# Patient Record
Sex: FEMALE | Race: WHITE | URBAN - METROPOLITAN AREA
[De-identification: names, ages, dates, MRNs, and addresses within clinical notes are randomized per-mention and may not be internally consistent; named-entity substitution may affect disease eponyms.]

---

## 2024-05-21 ENCOUNTER — OFFICE VISIT (OUTPATIENT)
Dept: FAMILY MEDICINE CLINIC | Facility: CLINIC | Age: 19
End: 2024-05-21

## 2024-05-21 VITALS
WEIGHT: 146 LBS | HEART RATE: 119 BPM | SYSTOLIC BLOOD PRESSURE: 138 MMHG | TEMPERATURE: 98 F | HEIGHT: 67 IN | OXYGEN SATURATION: 99 % | DIASTOLIC BLOOD PRESSURE: 72 MMHG | BODY MASS INDEX: 22.91 KG/M2

## 2024-05-21 DIAGNOSIS — R03.0 ELEVATED BLOOD PRESSURE READING: ICD-10-CM

## 2024-05-21 DIAGNOSIS — R09.81 CHRONIC NASAL CONGESTION: ICD-10-CM

## 2024-05-21 DIAGNOSIS — Z76.89 ENCOUNTER TO ESTABLISH CARE: Primary | ICD-10-CM

## 2024-05-21 PROCEDURE — 99203 OFFICE O/P NEW LOW 30 MIN: CPT

## 2024-05-21 RX ORDER — FLUTICASONE PROPIONATE 50 MCG
2 SPRAY, SUSPENSION (ML) NASAL DAILY
Qty: 16 G | Refills: 0 | Status: SHIPPED | OUTPATIENT
Start: 2024-05-21 | End: 2025-05-16

## 2024-05-21 RX ORDER — ALBUTEROL SULFATE 90 UG/1
2 AEROSOL, METERED RESPIRATORY (INHALATION) EVERY 4 HOURS PRN
COMMUNITY
Start: 2024-02-19 | End: 2025-05-14

## 2024-05-21 NOTE — PROGRESS NOTES
Janny Hernandez is a 18 year old female.  Chief Complaint   Patient presents with    Chest Congestion     Here for chest and nasal congestion since January 2024. Pt has tried albuterol inhaler and benzonatate for cough given at [facility] ( [facility])and amoxicillin 500 mg . Pt then went to urgent care and prescribed augmentin and steroids.      HPI:   Janny Hernandez presented to the clinic for nasal congestion since Jan 2024. Onset of symptoms with URI. Has seen  and [facility] in clinic 3 times. Placed on two rounds of antibiotics and two rounds of steroids. Symptoms return post treatment. Has not tried any OTC medications. No HX of seasonal allergies.     Current Outpatient Medications   Medication Sig Dispense Refill    albuterol 108 (90 Base) MCG/ACT Inhalation Aero Soln Inhale 2 puffs into the lungs every 4 (four) hours as needed.        History reviewed. No pertinent past medical history.   History reviewed. No pertinent surgical history.   Social History:  Social History     Socioeconomic History    Marital status: Single   Tobacco Use    Smoking status: Never    Smokeless tobacco: Never   Vaping Use    Vaping status: Never Used   Substance and Sexual Activity    Alcohol use: Not Currently    Drug use: Never    Sexual activity: Never      History reviewed. No pertinent family history.   No Known Allergies     REVIEW OF SYSTEMS:   Review of Systems   Constitutional:  Negative for activity change, fatigue and fever.   HENT:  Positive for congestion, postnasal drip and rhinorrhea. Negative for sinus pressure, sinus pain and sneezing.    Respiratory:  Negative for cough, chest tightness and shortness of breath.    Cardiovascular:  Negative for chest pain and palpitations.   Neurological: Negative.  Negative for headaches.   Psychiatric/Behavioral: Negative.     All other systems reviewed and are negative.     Wt Readings from Last 5 Encounters:   05/21/24 146 lb (66.2 kg)     Body mass  index is 22.87 kg/m².      EXAM:   /80 (BP Location: Left arm, Patient Position: Sitting, Cuff Size: adult)   Pulse 119   Temp 97.5 °F (36.4 °C) (Temporal)   Ht 5' 7\" (1.702 m)   Wt 146 lb (66.2 kg)   SpO2 99%   BMI 22.87 kg/m²   Physical Exam  Vitals reviewed.   Constitutional:       Appearance: Normal appearance.   HENT:      Head: Normocephalic and atraumatic.      Right Ear: Tympanic membrane and external ear normal.      Left Ear: Tympanic membrane and external ear normal.      Nose: Congestion and rhinorrhea present.      Right Sinus: No maxillary sinus tenderness or frontal sinus tenderness.      Left Sinus: No maxillary sinus tenderness or frontal sinus tenderness.      Mouth/Throat:      Mouth: Mucous membranes are moist.   Cardiovascular:      Rate and Rhythm: Normal rate and regular rhythm.      Pulses: Normal pulses.      Heart sounds: Normal heart sounds.   Pulmonary:      Effort: Pulmonary effort is normal.      Breath sounds: Normal breath sounds.   Neurological:      General: No focal deficit present.      Mental Status: She is alert and oriented to person, place, and time.   Psychiatric:         Mood and Affect: Mood normal.         Behavior: Behavior normal.            ASSESSMENT AND PLAN:   (Z76.89) Encounter to establish care  (primary encounter diagnosis)  (R09.81) Chronic nasal congestion  Plan: ENT Referral - [place] ([place]), fluticasone        propionate 50 MCG/ACT Nasal Suspension  Patient with chronic nasal congestion since January.  Has waxed and waned.  Has been on 2 rounds of antibiotics.  2 rounds of prednisone.  Symptoms improved on medication but then returned.  No history of seasonal allergies.  No other associated symptoms.  Advised over-the-counter antihistamine (Allegra, Claritin, Zyrtec) daily.  Prescription for Flonase to pharmacy.  Advised follow-up with ENT for further intervention and evaluation.  Referral placed.  Advise schedule.    (R03.0) Elevated blood  pressure reading  Plan: BP improved during visit. Denies symptoms. Will continue to monitor.     Follow-up for annual physical exam or sooner as needed.    The patient indicates understanding of these issues and agrees to the plan.  Chaperone offered to the patient prior to examination    This note was prepared using Dragon [employer] voice recognition dictation software. As a result errors may occur. When identified these errors have been corrected. While every attempt is made to correct errors during dictation discrepancies may still exist.

## 2024-05-23 ENCOUNTER — OFFICE VISIT (OUTPATIENT)
Dept: OTOLARYNGOLOGY | Facility: CLINIC | Age: 19
End: 2024-05-23

## 2024-05-23 DIAGNOSIS — J34.3 HYPERTROPHY OF NASAL TURBINATES: ICD-10-CM

## 2024-05-23 DIAGNOSIS — J34.2 DEVIATED NASAL SEPTUM: Primary | ICD-10-CM

## 2024-05-23 DIAGNOSIS — R09.81 NASAL CONGESTION: ICD-10-CM

## 2024-05-23 DIAGNOSIS — J34.89 NASAL OBSTRUCTION: ICD-10-CM

## 2024-05-23 RX ORDER — AZELASTINE 1 MG/ML
1 SPRAY, METERED NASAL 2 TIMES DAILY
Qty: 30 ML | Refills: 3 | Status: SHIPPED | OUTPATIENT
Start: 2024-05-23

## 2024-05-23 NOTE — PROGRESS NOTES
[facility]  OTOLARYNGOLOGY - HEAD & NECK SURGERY    5/23/2024     Reason for Consultation:   Nasal congestion    History of Present Illness:   Patient is a pleasant 18 year old female who is being seen for nasal congestion which first began in January after she sustained a sinus infection.  Since then she had been treated with multiple courses of antibiotics and prednisone which have helped her, but she has not returned completely back to normal.  She continues to have difficulty breathing through the nose.  She states that this difficulty breathing tends to alternate.  She denies any facial pressure or pain.  She denies any postnasal drip.  She does not have any signs or symptoms of allergic rhinitis.  No history of any imaging for the sinuses.  She has not yet had a full trial of a nasal steroid or nasal antihistamine.    Past Medical History  History reviewed. No pertinent past medical history.    Past Surgical History  History reviewed. No pertinent surgical history.    Family History  History reviewed. No pertinent family history.    Social History  Pediatric History   Patient Parents    pierre smith (Mother)     Other Topics Concern    Not on file   Social History Narrative    Not on file           Current Medications:  Current Outpatient Medications   Medication Sig Dispense Refill    fluticasone propionate 50 MCG/ACT Nasal Suspension 2 sprays by Each Nare route daily. 16 g 0    albuterol 108 (90 Base) MCG/ACT Inhalation Aero Soln Inhale 2 puffs into the lungs every 4 (four) hours as needed.         Allergies  No Known Allergies    Review of Systems:   A comprehensive 10 point review of systems was completed.  Pertinent positives and negatives noted in the the HPI.    Physical Exam:   There were no vitals taken for this visit.    GENERAL: No acute distress, Comfortable appearing  FACE: HB 1/6, Normal Animation  HEAD: Normocephalic  EYES: EOMI, pupils equil  EARS: Bilateral Auricles Symmetric, bilateral  tympanic membranes normal  NOSE: Nares patent bilaterally  ORAL CAVITY: Tongue mobile, Oropharynx clear, Floor of mouth clear, Posterior oropharynx normal  NECK: No palpable lymphadenopathy, thyroid not palpable, nontender    PROCEDURE: BILATERAL RIGID NASAL ENDOSCOPY  Bilateral rigid nasal endoscopy (27782) was performed. Verbal consent was obtained from the patient to proceed with rigid nasal endoscopy. The nasal cavity was decongested and topically anesthetized with a combination of Oxymetazoline and 4% Lidocaine. A rigid 4mm 30 degree nasal endoscope connected to a high-definition endoscopy system was used to examine both nasal cavities. Digital photos and/or videos of relevant exam findings were obtained. The inferior meatus, inferior turbinate, nasopharynx, middle meatus, middle turbinate, superior meatus, superior turbinate, and sphenoethmoidal recess were examined bilaterally and deemed to be normal, with any exceptions as noted below. At the completion of the procedure the endoscope was removed. The patient tolerated the procedure well. There were no complications.    Findings: The bilateral inferior turbinates were enlarged. The Septum was deviated to the left mildly. The middle meatus was patent bilaterally without any pus drainage. There were no obvious masses or polyps noted.      Results:     Laboratory Data:  No results found for: \"WBC\", \"HGB\", \"HCT\", \"PLT\", \"CREATSERUM\", \"BUN\", \"NA\", \"K\", \"CL\", \"CO2\", \"GLU\", \"CA\", \"ALB\", \"ALKPHO\", \"TP\", \"AST\", \"ALT\", \"PTT\", \"INR\", \"PTP\", \"T4F\", \"TSH\", \"TSHREFLEX\", \"RASHEL\", \"LIP\", \"GGT\", \"PSA\", \"DDIMER\", \"ESRML\", \"ESRPF\", \"CRP\", \"BNP\", \"MG\", \"PHOS\", \"TROP\", \"CK\", \"CKMB\", \"CHRISTINE\", \"RPR\", \"B12\", \"ETOH\", \"POCGLU\"      Imaging:  No results found.      Impression:     Deviated Nasal Septum  Bilateral Inferior Turbinate Hypertrophy  Nasal Congestion  Nasal Obstruction      Recommendations:  I will trial her on a combination of Flonase and azelastine for 1 month  If she has  persistent nasal congestion I may have her obtain allergy test to see if this may be contributing  We can also consider septoplasty and turbinate reduction at some point if she continues to have this problem    Thank you for allowing me to participate in the care of your patient.    Lavon Camarillo, DO   Otolaryngology/Rhinology, Sinus, and Endoscopic Skull Base Surgery  [address] [place] [address]  Phone [phone]  Fax [phone]  5/23/2024  11:43 AM  5/23/2024

## 2024-06-12 DIAGNOSIS — R09.81 CHRONIC NASAL CONGESTION: ICD-10-CM

## 2024-06-12 NOTE — TELEPHONE ENCOUNTER
fluticasone propionate 50 MCG/ACT Nasal Suspension, 2 sprays by Each Nare route daily., Disp: 16 g, Rfl: 0

## 2024-06-14 RX ORDER — FLUTICASONE PROPIONATE 50 MCG
2 SPRAY, SUSPENSION (ML) NASAL DAILY
Qty: 16 G | Refills: 0 | Status: SHIPPED | OUTPATIENT
Start: 2024-06-14 | End: 2025-06-09

## 2024-06-14 NOTE — TELEPHONE ENCOUNTER
Refill Passed per Protocol.     Requested Prescriptions   Pending Prescriptions Disp Refills    fluticasone propionate 50 MCG/ACT Nasal Suspension 16 g 0     Si sprays by Each Nare route daily.       Allergy Medication Protocol Passed - 2024 10:13 AM        Passed - In person appointment or virtual visit in the past 12 mos or appointment in next 3 mos     Recent Outpatient Visits              3 weeks ago Deviated nasal septum    [facility] Lavon Camarillo DO    Office Visit    3 weeks ago [facility] to [facility] [place] [facility]    [facility] Visit                              Recent Outpatient Visits              3 weeks ago Deviated nasal septum    [facility] [place]    Office Visit    3 weeks ago Encounter to establish care    [facility] Navya Alfredo APRN    Office Visit

## 2024-06-14 NOTE — TELEPHONE ENCOUNTER
Refill Passed per Protocol.     Requested Prescriptions   Pending Prescriptions Disp Refills    fluticasone propionate 50 MCG/ACT Nasal Suspension 16 g 0     Si sprays by Each Nare route daily.       Allergy Medication Protocol Passed - 2024 10:13 AM        Passed - In person appointment or virtual visit in the past 12 mos or appointment in next 3 mos     Recent Outpatient Visits              3 weeks ago Deviated nasal septum    [facility], [facility] Lavon Camarillo DO    Office Visit    3 weeks ago Encounter to establish care    [facility] [place]    Office Visit                              Recent Outpatient Visits              3 weeks ago Deviated nasal septum    [facility] [place]    Office Visit    3 weeks ago Encounter to establish care    [facility] Navya Alfredo APRN    Office Visit

## 2024-08-08 ENCOUNTER — OFFICE VISIT (OUTPATIENT)
Dept: OTOLARYNGOLOGY | Facility: CLINIC | Age: 19
End: 2024-08-08

## 2024-08-08 DIAGNOSIS — J34.89 NASAL OBSTRUCTION: ICD-10-CM

## 2024-08-08 DIAGNOSIS — R09.81 NASAL CONGESTION: ICD-10-CM

## 2024-08-08 DIAGNOSIS — J34.2 DEVIATED NASAL SEPTUM: Primary | ICD-10-CM

## 2024-08-08 DIAGNOSIS — J34.3 HYPERTROPHY OF NASAL TURBINATES: ICD-10-CM

## 2024-08-08 PROCEDURE — 99213 OFFICE O/P EST LOW 20 MIN: CPT | Performed by: STUDENT IN AN ORGANIZED HEALTH CARE EDUCATION/TRAINING PROGRAM

## 2024-08-08 PROCEDURE — 31231 NASAL ENDOSCOPY DX: CPT | Performed by: STUDENT IN AN ORGANIZED HEALTH CARE EDUCATION/TRAINING PROGRAM

## 2024-08-08 RX ORDER — FLUTICASONE PROPIONATE 50 MCG
2 SPRAY, SUSPENSION (ML) NASAL 2 TIMES DAILY
Qty: 16 G | Refills: 11 | Status: SHIPPED | OUTPATIENT
Start: 2024-08-08

## 2024-08-08 RX ORDER — AZELASTINE 1 MG/ML
2 SPRAY, METERED NASAL 2 TIMES DAILY
Qty: 30 ML | Refills: 11 | Status: SHIPPED | OUTPATIENT
Start: 2024-08-08

## 2024-08-08 NOTE — PROGRESS NOTES
[place]  OTOLARYNGOLOGY - HEAD & NECK SURGERY    8/8/2024     Reason for Consultation:   Nasal congestion    History of Present Illness:   Patient is a pleasant 18 year old female who is being seen for nasal congestion which first began in January after she sustained a sinus infection.  Since then she had been treated with multiple courses of antibiotics and prednisone which have helped her, but she has not returned completely back to normal.  She continues to have difficulty breathing through the nose.  She states that this difficulty breathing tends to alternate.  She denies any facial pressure or pain.  She denies any postnasal drip.  She does not have any signs or symptoms of allergic rhinitis.  No history of any imaging for the sinuses.  She has not yet had a full trial of a nasal steroid or nasal antihistamine.    INTERVAL HISTORY  8/8/2024: Patient returns today after having tried nasal sprays. She states she did have significant improvement with the sprays while she was on them. She did stop the sprays and her symptoms slowly started to come back. She does not have any issues with sense of smell.    Past Medical History  No past medical history on file.    Past Surgical History  No past surgical history on file.    Family History  No family history on file.    Social History  Pediatric History   Patient Parents    pierre smith (Mother)     Other Topics Concern    Not on file   Social History Narrative    Not on file           Current Medications:  Current Outpatient Medications   Medication Sig Dispense Refill    fluticasone propionate 50 MCG/ACT Nasal Suspension 2 sprays by Each Nare route daily. 16 g 0    azelastine 0.1 % Nasal Solution 1 spray by Nasal route 2 (two) times daily. 30 mL 3    albuterol 108 (90 Base) MCG/ACT Inhalation Aero Soln Inhale 2 puffs into the lungs every 4 (four) hours as needed.         Allergies  No Known Allergies    Review of Systems:   A comprehensive 10 point review of  systems was completed.  Pertinent positives and negatives noted in the the HPI.    Physical Exam:   There were no vitals taken for this visit.    GENERAL: No acute distress, Comfortable appearing  FACE: HB 1/6, Normal Animation  HEAD: Normocephalic  EYES: EOMI, pupils equil  EARS: Bilateral Auricles Symmetric, bilateral tympanic membranes normal  NOSE: Nares patent bilaterally  ORAL CAVITY: Tongue mobile, Oropharynx clear, Floor of mouth clear, Posterior oropharynx normal  NECK: No palpable lymphadenopathy, thyroid not palpable, nontender    PROCEDURE: BILATERAL RIGID NASAL ENDOSCOPY - As performed on 8/8/2024  Bilateral rigid nasal endoscopy (38378) was performed. Verbal consent was obtained from the patient to proceed with rigid nasal endoscopy.  A rigid 4mm 30 degree nasal endoscope was used to examine both nasal cavities. The inferior meatus, inferior turbinate, nasopharynx, middle meatus, middle turbinate, superior meatus, superior turbinate, and sphenoethmoidal recess were examined bilaterally and deemed to be normal, with any exceptions as noted below. At the completion of the procedure the endoscope was removed. The patient tolerated the procedure well. There were no complications.    Findings: The bilateral inferior turbinates were much less edematous today, and normal appearing. The Septum was deviated to the left mildly. The middle meatus was patent bilaterally without any pus drainage, similar to last exam. There were no obvious masses or polyps noted.    Results:     Laboratory Data:  No results found for: \"WBC\", \"HGB\", \"HCT\", \"PLT\", \"CREATSERUM\", \"BUN\", \"NA\", \"K\", \"CL\", \"CO2\", \"GLU\", \"CA\", \"ALB\", \"ALKPHO\", \"TP\", \"AST\", \"ALT\", \"PTT\", \"INR\", \"PTP\", \"T4F\", \"TSH\", \"TSHREFLEX\", \"RASHEL\", \"LIP\", \"GGT\", \"PSA\", \"DDIMER\", \"ESRML\", \"ESRPF\", \"CRP\", \"BNP\", \"MG\", \"PHOS\", \"TROP\", \"CK\", \"CKMB\", \"CHRISTINE\", \"RPR\", \"B12\", \"ETOH\", \"POCGLU\"      Imaging:  No results found.      Impression:     Deviated Nasal Septum  Bilateral  Inferior Turbinate Hypertrophy  Nasal Congestion  Nasal Obstruction      Recommendations:  She may continue Flonase and Azelastine as needed  She can consider allergy testing at later time if she desires, however this would not really  as she has had improvement with sprays    Thank you for allowing me to participate in the care of your patient.    Lavon Camarillo,    Otolaryngology/Rhinology, Sinus, and Endoscopic Skull Base Surgery  [address] [place] [address]  Phone [phone]  Fax [phone]  5/23/2024  11:43 AM  8/8/2024

## 2025-08-14 ENCOUNTER — HOSPITAL ENCOUNTER (OUTPATIENT)
Age: 20
Discharge: HOME | End: 2025-08-14

## 2025-08-14 VITALS
RESPIRATION RATE: 20 BRPM | DIASTOLIC BLOOD PRESSURE: 90 MMHG | HEART RATE: 104 BPM | OXYGEN SATURATION: 100 % | SYSTOLIC BLOOD PRESSURE: 149 MMHG | TEMPERATURE: 99 F

## 2025-08-14 DIAGNOSIS — L03.818 CELLULITIS OF OTHER SPECIFIED SITE: Primary | ICD-10-CM

## 2025-08-14 PROCEDURE — 99213 OFFICE O/P EST LOW 20 MIN: CPT | Performed by: NURSE PRACTITIONER

## 2025-08-14 RX ORDER — MUPIROCIN 2 %
1 OINTMENT (GRAM) TOPICAL 3 TIMES DAILY
Qty: 22 G | Refills: 0 | Status: SHIPPED | OUTPATIENT
Start: 2025-08-14 | End: 2025-08-24

## 2025-08-14 RX ORDER — CLINDAMYCIN PHOSPHATE AND BENZOYL PEROXIDE 37.5; 1 MG/G; MG/G
GEL TOPICAL
COMMUNITY
Start: 2025-04-30

## 2025-08-14 RX ORDER — CEPHALEXIN 500 MG/1
500 CAPSULE ORAL 2 TIMES DAILY
Qty: 14 CAPSULE | Refills: 0 | Status: SHIPPED | OUTPATIENT
Start: 2025-08-14 | End: 2025-08-21

## 2025-08-14 RX ORDER — TRETINOIN 0.25 MG/G
CREAM TOPICAL
COMMUNITY
Start: 2025-04-30